# Patient Record
Sex: MALE | Race: BLACK OR AFRICAN AMERICAN | Employment: PART TIME | URBAN - METROPOLITAN AREA
[De-identification: names, ages, dates, MRNs, and addresses within clinical notes are randomized per-mention and may not be internally consistent; named-entity substitution may affect disease eponyms.]

---

## 2020-05-20 ENCOUNTER — OFFICE VISIT (OUTPATIENT)
Dept: CARDIOLOGY CLINIC | Age: 25
End: 2020-05-20
Payer: COMMERCIAL

## 2020-05-20 VITALS
HEART RATE: 88 BPM | SYSTOLIC BLOOD PRESSURE: 132 MMHG | HEIGHT: 67 IN | WEIGHT: 269 LBS | OXYGEN SATURATION: 98 % | BODY MASS INDEX: 42.22 KG/M2 | DIASTOLIC BLOOD PRESSURE: 84 MMHG | TEMPERATURE: 97.8 F

## 2020-05-20 DIAGNOSIS — I10 ESSENTIAL HYPERTENSION: ICD-10-CM

## 2020-05-20 PROCEDURE — 93000 ELECTROCARDIOGRAM COMPLETE: CPT | Performed by: INTERNAL MEDICINE

## 2020-05-20 PROCEDURE — 99204 OFFICE O/P NEW MOD 45 MIN: CPT | Performed by: INTERNAL MEDICINE

## 2020-05-20 SDOH — HEALTH STABILITY: MENTAL HEALTH: HOW OFTEN DO YOU HAVE A DRINK CONTAINING ALCOHOL?: NEVER

## 2020-05-20 NOTE — PATIENT INSTRUCTIONS
ibuprofen. Some of these medicines can raise blood pressure. · Learn how to check your blood pressure at home. Lifestyle changes  · Stay at a healthy weight. This is especially important if you put on weight around the waist. Losing even 10 pounds can help you lower your blood pressure. · If your doctor recommends it, get more exercise. Walking is a good choice. Bit by bit, increase the amount you walk every day. Try for at least 30 minutes on most days of the week. You also may want to swim, bike, or do other activities. · Avoid or limit alcohol. Talk to your doctor about whether you can drink any alcohol. · Try to limit how much sodium you eat to less than 2,300 milligrams (mg) a day. Your doctor may ask you to try to eat less than 1,500 mg a day. · Eat plenty of fruits (such as bananas and oranges), vegetables, legumes, whole grains, and low-fat dairy products. · Lower the amount of saturated fat in your diet. Saturated fat is found in animal products such as milk, cheese, and meat. Limiting these foods may help you lose weight and also lower your risk for heart disease. · Do not smoke. Smoking increases your risk for heart attack and stroke. If you need help quitting, talk to your doctor about stop-smoking programs and medicines. These can increase your chances of quitting for good. When should you call for help? Call  911 anytime you think you may need emergency care. This may mean having symptoms that suggest that your blood pressure is causing a serious heart or blood vessel problem. Your blood pressure may be over 180/120.   For example, call  911 if:    · You have symptoms of a heart attack. These may include:  ? Chest pain or pressure, or a strange feeling in the chest.  ? Sweating. ? Shortness of breath. ? Nausea or vomiting. ? Pain, pressure, or a strange feeling in the back, neck, jaw, or upper belly or in one or both shoulders or arms. ? Lightheadedness or sudden weakness.   ? A fast or breathing for 10 seconds or longer during sleep. It can be mild to severe, based on the number of times an hour that you stop breathing or have slowed breathing. Blocked or narrowed airways in your nose, mouth, or throat can cause sleep apnea. Your airway can become blocked when your throat muscles and tongue relax during sleep. You can treat sleep apnea at home by making lifestyle changes. You also can use a CPAP breathing machine that keeps tissues in the throat from blocking your airway. Or your doctor may suggest that you use a breathing device while you sleep. It helps keep your airway open. This could be a device that you put in your mouth. In some cases, surgery may be needed to remove enlarged tissues in the throat. Follow-up care is a key part of your treatment and safety. Be sure to make and go to all appointments, and call your doctor if you are having problems. It's also a good idea to know your test results and keep a list of the medicines you take. How can you care for yourself at home? · Lose weight, if needed. It may reduce the number of times you stop breathing or have slowed breathing. · Sleep on your side. It may stop mild apnea. If you tend to roll onto your back, sew a pocket in the back of your pajama top. Put a tennis ball into the pocket, and stitch the pocket shut. This will help keep you from sleeping on your back. · Avoid alcohol and medicines such as sleeping pills and sedatives before bed. · Do not smoke. Smoking can make sleep apnea worse. If you need help quitting, talk to your doctor about stop-smoking programs and medicines. These can increase your chances of quitting for good. · Prop up the head of your bed 4 to 6 inches by putting bricks under the legs of the bed. · Treat breathing problems, such as a stuffy nose, caused by a cold or allergies. · Try a continuous positive airway pressure (CPAP) breathing machine if your doctor recommends it.  The machine keeps your airway

## 2020-05-20 NOTE — PROGRESS NOTES
throat. · Cardiovascular: as reviewed in HPI  · Respiratory: No cough or wheezing, no sputum production. No hemoptysis. · Gastrointestinal: No abdominal pain, appetite loss, blood in stools. No change in bowel or bladder habits. · Genitourinary: No dysuria, trouble voiding, or hematuria. · Musculoskeletal:  No gait disturbance, no joint complaints. · Integumentary: No rash or pruritis. · Neurological: No headache, diplopia, change in muscle strength, numbness or tingling. · Psychiatric: No anxiety or depression. · Endocrine: No temperature intolerance. No excessive thirst, fluid intake, or urination. No tremor. · Hematologic/Lymphatic: No abnormal bruising or bleeding, blood clots or swollen lymph nodes. · Allergic/Immunologic: No nasal congestion or hives. Objective:   PHYSICAL EXAM:    Vitals:    05/20/20 1458   BP: 132/84   Pulse: 88   Temp: 97.8 °F (36.6 °C)   SpO2: 98%    Weight: 269 lb (122 kg)       General Appearance:  Alert, cooperative, no distress, appears stated age. Head:  Normocephalic, without obvious abnormality, atraumatic. Eyes:  Pupils equal and round. No scleral icterus. Mouth: Moist mucosa, no pharyngeal erythema. Nose: Nares normal. No drainage or sinus tenderness. Neck: Supple, symmetrical, trachea midline. No adenopathy. No tenderness/mass/nodules. No carotid bruit or elevated JVD. Lungs:   Clear to auscultation bilaterally, respirations unlabored. No wheeze, rales, or rhonchi. Chest Wall:  No tenderness or deformity. Heart:  Regular rate. S1/S2 normal. No murmur, rub, or gallop. Abdomen:   Soft, non-tender, bowel sounds active. Musculoskeletal: No muscle wasting or digital clubbing. Extremities: Extremities normal, atraumatic. No cyanosis or edema. Pulses: 2+ radial and carotid pulses, symmetric. Skin: No rashes or lesions. Pysch: Normal mood and affect. Alert and oriented x 4.    Neurologic: Normal gross motor and sensory exam.       Labs     CBC: No results found for: WBC, RBC, HGB, HCT, MCV, RDW, PLT  CMP:No results found for: NA, K, CL, CO2, BUN, CREATININE, GFRAA, AGRATIO, LABGLOM, GLUCOSE, PROT, CALCIUM, BILITOT, ALKPHOS, AST, ALT  PT/INR:  No results found for: PTINR  HgBA1c:No results found for: LABA1C  No results found for: CKTOTAL, CKMB, CKMBINDEX, TROPONINI    CURRENT Medications:  No current outpatient medications on file prior to visit. No current facility-administered medications on file prior to visit. Assessment and Plan   1) Abnormal ECG   Possible left atrial enlargement noted on previous ECG  Echo to evaluate further    2) Essential hypertension  Controlled today in office   Work up for secondary causes: renal artery duplex, renin and aldosterone level   Referral to sleep medicine     3) Family history of early CAD  Episodes of chest pain and SOB   GXT stress test     3) Morbid obesity   Encouraged increased aerobic activity and heart healthy diet      Follow up in 3 months       Thank you for allowing us to participate in the care of [facility]. Ling Lim MD [address] [place] [address] and Interventional Cardiology   [facility] [place] [facility]   (C): [phone]  Destiney Santiago): [phone]     Physician Attestation:  The scribes documentation has been prepared under my direction and personally reviewed by me in its entirety. I confirm the note above accurately reflects all work, treatment, procedures, and medical decision making performed by me. Electronically signed by Ginny Weaver MD on 5/25/2020 at 6:18 PM         This note was scribed in the presence of Ling Lim MD by General Ghosh, RN.

## 2020-05-23 LAB — RENIN ACTIVITY: 1 NG/ML/HR

## 2020-08-28 ENCOUNTER — HOSPITAL ENCOUNTER (OUTPATIENT)
Dept: NON INVASIVE DIAGNOSTICS | Age: 25
Discharge: HOME | End: 2020-08-28
Payer: COMMERCIAL

## 2020-08-28 ENCOUNTER — HOSPITAL ENCOUNTER (OUTPATIENT)
Dept: VASCULAR LAB | Age: 25
Discharge: HOME | End: 2020-08-28
Payer: COMMERCIAL

## 2020-08-28 LAB
LV EF: 58 %
LVEF MODALITY: NORMAL

## 2020-08-28 PROCEDURE — 93017 CV STRESS TEST TRACING ONLY: CPT

## 2020-08-28 PROCEDURE — 93975 VASCULAR STUDY: CPT

## 2020-08-28 PROCEDURE — 93306 TTE W/DOPPLER COMPLETE: CPT

## 2020-09-07 NOTE — RESULT ENCOUNTER NOTE
Please notify patient that their kidney US shows a mild blockage of one artery. Nothing to do. Monitor for now.

## 2020-09-17 NOTE — PROGRESS NOTES
Cardiology Consultation   Referring Physician: Self referral  Reason for Consultation: Abnormal ECG  Chief Complaint:   Chief Complaint   Patient presents with    3 Month Follow-Up     no cc         Subjective:   History of Present Illness:  Jacki Perez is a 23 y.o. male who presents with the above complaint. Patient has a past medical history significant for hypertension and obesity. He presented to [facility] ED with complaints of chest pain on 4/15/20 and ECG revealed probable left atrial enlargement. T  He reports his mother suffering a heart attack in her 45s. He has not medical history of his father. Today, he presents today to discuss testing findings. He denies any new or recurrent cardiac sounding complaints. He feels he is doing well on BB therapy. Patient denies exertional chest pain/pressure, dyspnea at rest, AVILA, PND, orthopnea, palpitations, lightheadedness, weight changes, changes in LE edema, and syncope. Prior cardiac testing:    EK20 not completed     Echo: 20  Normal left ventricle size, wall thickness, and systolic function with an   estimated ejection fraction of 55-60%. No regional wall motion abnormalities  are seen. Normal diastolic function. Trivial tricuspid regurgitation. Stress Test: 20  Normal electrocardiographic response to exercise with less than 1.0 mm of up   sloping ST depression. Normal GXT,negative for ischemia. Slightly reduced excercise capacity   Exercise-induced shortness of breath 10/10 and fatigue. No chest pain. Renal artery duplex:20     Right Impression    There appears to be a 1-59% stenosis of the mid right renal artery. The parenchymal resistive index is abnormal measuring . 78. The right kidney measured 10 cm in length. Left Impression    There is no evidence to suggest renal artery stenosis. The left renal vein is patent.     The parenchymal resistive index is upper limits of normal.    The left kidney measured 10.1cm in length.             Cath: no prior hx. Past Medical History:   has no past medical history on file. Surgical History:   has no past surgical history on file. Social History:   reports that he has never smoked. He has never used smokeless tobacco. He reports that he does not drink alcohol. Family History:  family history is not on file. Home Medications:  Were reviewed and are listed in nursing record and/or below  Prior to Admission medications    Medication Sig Start Date End Date Taking? Authorizing Provider   metoprolol succinate (TOPROL XL) 25 MG extended release tablet Take 25 mg by mouth daily   Yes Historical Provider, MD      Allergies:  Patient has no known allergies. Review of Systems:   · Constitutional: no unanticipated weight loss. There's been no change in energy level, sleep pattern, or activity level. No fevers, chills. · Eyes: No visual changes or diplopia. No scleral icterus. · ENT: No Headaches, hearing loss or vertigo. No mouth sores or sore throat. · Cardiovascular: as reviewed in HPI  · Respiratory: No cough or wheezing, no sputum production. No hemoptysis. · Gastrointestinal: No abdominal pain, appetite loss, blood in stools. No change in bowel or bladder habits. · Genitourinary: No dysuria, trouble voiding, or hematuria. · Musculoskeletal:  No gait disturbance, no joint complaints. · Integumentary: No rash or pruritis. · Neurological: No headache, diplopia, change in muscle strength, numbness or tingling. · Psychiatric: No anxiety or depression. · Endocrine: No temperature intolerance. No excessive thirst, fluid intake, or urination. No tremor. · Hematologic/Lymphatic: No abnormal bruising or bleeding, blood clots or swollen lymph nodes. · Allergic/Immunologic: No nasal congestion or hives.     Objective:   PHYSICAL EXAM:    Vitals:    09/21/20 0951   BP: 112/84   Pulse: 62   Temp: 97.7 °F (36.5 °C)   SpO2: 99%            General Appearance:  Alert, cooperative, no distress, appears stated age. Head:  Normocephalic, without obvious abnormality, atraumatic. Eyes:  Pupils equal and round. No scleral icterus. Mouth: Moist mucosa, no pharyngeal erythema. Nose: Nares normal. No drainage or sinus tenderness. Neck: Supple, symmetrical, trachea midline. No adenopathy. No tenderness/mass/nodules. No carotid bruit or elevated JVD. Lungs:   Clear to auscultation bilaterally, respirations unlabored. No wheeze, rales, or rhonchi. Chest Wall:  No tenderness or deformity. Heart:  Regular rate. S1/S2 normal. No murmur, rub, or gallop. Abdomen:   Soft, non-tender, bowel sounds active. Musculoskeletal: No muscle wasting or digital clubbing. Extremities: Extremities normal, atraumatic. No cyanosis or edema. Pulses: 2+ radial and carotid pulses, symmetric. Skin: No rashes or lesions. Pysch: Normal mood and affect. Alert and oriented x 4. Neurologic: Normal gross motor and sensory exam.       Labs     Care Everywhere-5/4/20 [facility]  H/H 17.8/53.0, LFT, BMP normal, A1c 5.9, lipid normal except HDL 35., LDLc 71. Assessment and Plan   1) Abnormal ECG   Possible left atrial enlargement noted on previous ECG  Echo normal.     2) Essential hypertension  Controlled today in office   Work up for secondary causes: renal artery duplex 8/28/20revealed R mild blockage, will continue to monitor. 5/20/20 renin wnl and aldosterone level not completed  Referral to sleep medicine     3) Family history of early CAD  Episodes of chest pain and SOB   GXT stress test and echocardiogram  8/28/20 normal.     3) Morbid obesity   Encouraged increased aerobic activity and heart healthy diet      Follow up PRN. Thank you for allowing us to participate in the care of Charlie Albarado.     Anu Cortes MD [address] [place], [place] [address]e and Interventional Cardiology   [facility]   (C): [phone]  (O): [phone]     This note was scribed in the presence of Dr. Michelle Christiansen MD by Madyson Frankel RN. Physician Attestation:  The scribes documentation has been prepared under my direction and personally reviewed by me in its entirety. I confirm the note above accurately reflects all work, treatment, procedures, and medical decision making performed by me.     Electronically signed by Val Coronado MD on 10/4/2020 at 10:37 PM

## 2020-09-21 ENCOUNTER — OFFICE VISIT (OUTPATIENT)
Dept: CARDIOLOGY CLINIC | Age: 25
End: 2020-09-21
Payer: COMMERCIAL

## 2020-09-21 VITALS
HEIGHT: 67 IN | TEMPERATURE: 97.7 F | BODY MASS INDEX: 42.06 KG/M2 | HEART RATE: 62 BPM | OXYGEN SATURATION: 99 % | DIASTOLIC BLOOD PRESSURE: 84 MMHG | WEIGHT: 268 LBS | SYSTOLIC BLOOD PRESSURE: 112 MMHG

## 2020-09-21 PROCEDURE — 99213 OFFICE O/P EST LOW 20 MIN: CPT | Performed by: INTERNAL MEDICINE

## 2020-09-21 RX ORDER — METOPROLOL SUCCINATE 25 MG/1
25 TABLET, EXTENDED RELEASE ORAL DAILY
COMMUNITY